# Patient Record
Sex: FEMALE | Race: WHITE | NOT HISPANIC OR LATINO | URBAN - METROPOLITAN AREA
[De-identification: names, ages, dates, MRNs, and addresses within clinical notes are randomized per-mention and may not be internally consistent; named-entity substitution may affect disease eponyms.]

---

## 2018-05-14 ENCOUNTER — OFFICE VISIT - RIVER FALLS (OUTPATIENT)
Dept: FAMILY MEDICINE | Facility: CLINIC | Age: 33
End: 2018-05-14

## 2022-02-15 NOTE — PROGRESS NOTES
Patient:   JIMMY CHOW            MRN: [id number]            FIN: [id number]               Age:   32 years     Sex:  Female     :  1985   Associated Diagnoses:   Spotting in first trimester   Author:   Michael Raman MD      Chief Complaint   first trimester spotting      History of Present Illness   patient with second episode of vaginal spotting in first trimester started today  had miscarriage last September  no pain  lmp   gest age 11 weeks  had spotting last Thursday that resolved  has had first OB with Dr. Chaidez with ultrasound between 8-9 weeks with normal heart beat      Health Status   Allergies:    Allergic Reactions (All)  No known allergies   Medications:  (Selected)   Prescriptions  Prescribed  Lexapro 10 mg oral tablet: 1 tab(s) ( 10 mg ), PO, Daily, # 30 tab(s), 0 Refill(s), Type: Maintenance, Pharmacy: [id number], 1 tab(s) po daily  Documented Medications  Documented  Bibiana 13.5 mg intrauteral device: 1 EA ( 13.5 mg ), intrauteral, once, 0 Refill(s), Type: Maintenance      Histories   Past Medical History:    Resolved  Tobacco abuse (ICD-9-.1):  Resolved.  Comments:  9/10/2010 CDT 7:39 AM CDT - Clementine Cuevas  1 pk/day    2011 CST 6:11 PM CST - Rhiannon Dove  quit 2010   Family History:    Osteopenia  Mother (Lyndsey)  Cancer  Grandfather (M)  Heart murmur  Father (José Miguel)  Hypertension  Father (José Miguel)  JUNIOR - Obstructive sleep apnea  Father (José Miguel)  Hyperlipidemia  Father (José Miguel)     Procedure history:    None ([id number]).   Social History:        Alcohol Assessment: Current            Current, 1-2 times per week, 6 drinks/episode average.      Tobacco Assessment: Past            Past, Cigarettes, 8 year(s).      Substance Abuse Assessment: Denies Substance Abuse      Employment and Education Assessment            Employed      Home and Environment Assessment            Marital status: Single.  0 children.      Nutrition and Health Assessment            Type of  diet: Regular.      Exercise and Physical Activity Assessment: Regular exercise            Exercise frequency: 4-5 times per week.  Exercise type: Yoga, pilates,kettlebells,TRX.      Sexual Assessment            Sexually active: Yes.  Sexual orientation: Heterosexual.  Other contraceptive use: OCP.        Physical Examination   abdomen is soft, nontender  FHT heard at 160s      Impression and Plan   Diagnosis     Spotting in first trimester ([id number]-HI O26.851).     Plan:  given audible FHT, reasonable to wait for tomorrow and call Dr. Kent to schedule follow up this week for recheck. If develops pain or heavier bleeding, consider ER follow up..